# Patient Record
Sex: FEMALE | URBAN - METROPOLITAN AREA
[De-identification: names, ages, dates, MRNs, and addresses within clinical notes are randomized per-mention and may not be internally consistent; named-entity substitution may affect disease eponyms.]

---

## 2024-07-27 ENCOUNTER — APPOINTMENT (RX ONLY)
Dept: URBAN - METROPOLITAN AREA CLINIC 42 | Facility: CLINIC | Age: 39
Setting detail: DERMATOLOGY
End: 2024-07-27

## 2024-07-27 DIAGNOSIS — D49.2 NEOPLASM OF UNSPECIFIED BEHAVIOR OF BONE, SOFT TISSUE, AND SKIN: ICD-10-CM | Status: INADEQUATELY CONTROLLED

## 2024-07-27 DIAGNOSIS — D17 BENIGN LIPOMATOUS NEOPLASM: ICD-10-CM

## 2024-07-27 PROBLEM — D17.0 BENIGN LIPOMATOUS NEOPLASM OF SKIN AND SUBCUTANEOUS TISSUE OF HEAD, FACE AND NECK: Status: ACTIVE | Noted: 2024-07-27

## 2024-07-27 PROCEDURE — ? DIAGNOSIS COMMENT

## 2024-07-27 PROCEDURE — 99202 OFFICE O/P NEW SF 15 MIN: CPT

## 2024-07-27 PROCEDURE — ? PHOTO-DOCUMENTATION

## 2024-07-27 PROCEDURE — ? COUNSELING

## 2024-07-27 PROCEDURE — ? ORDER ULTRASOUND

## 2024-07-27 ASSESSMENT — LOCATION ZONE DERM: LOCATION ZONE: FACE

## 2024-07-27 ASSESSMENT — LOCATION SIMPLE DESCRIPTION DERM: LOCATION SIMPLE: RIGHT FOREHEAD

## 2024-07-27 ASSESSMENT — LOCATION DETAILED DESCRIPTION DERM: LOCATION DETAILED: RIGHT FOREHEAD

## 2024-07-27 NOTE — PROCEDURE: ORDER ULTRASOUND
Other Ultrasound Protocol Reason: Doppler ultrasound rule out lipoma vs cyst. Please check depth, muscle and nerve involvement. Patient complains of tingling and numbness in right fourth and fifth digits. Please check for nerve compression or involvement
Subcutaneous Lesion Ultrasound Reason: Duplex Ultrasound Subcutaneous mass rule out lipoma vs cyst
Ultrasound Protocol: Ultrasound of Subcutaneous Mass
Detail Level: Simple
Other Ultrasound Protocol Name: [facility] [place] [facility]
Priority: normal
Provider: Tim Al DO

## 2024-07-27 NOTE — PROCEDURE: DIAGNOSIS COMMENT
Comment: Pt reports lesion present on forehead for the past 2 yrs, mentions sx of enlarging\\nPt states that her lesion has grown a lot more recently\\nPt denies sx of pain w lesion\\nNoted that lesion is present under the muscle\\nDiscussed that surgery will need to be done with a plastic surgeon or with another doctor in-office, \\nDiscussed that ultrasound will have to be done to determine characteristics of this lesion, pt provided ultrasound order to obtain prior to scheduling surgery w \\nFU w  for surgery after receiving ultrasound results
Detail Level: Simple
Render Risk Assessment In Note?: no

## 2024-09-23 ENCOUNTER — APPOINTMENT (RX ONLY)
Dept: URBAN - METROPOLITAN AREA CLINIC 42 | Facility: CLINIC | Age: 39
Setting detail: DERMATOLOGY
End: 2024-09-23

## 2024-09-23 DIAGNOSIS — D49.2 NEOPLASM OF UNSPECIFIED BEHAVIOR OF BONE, SOFT TISSUE, AND SKIN: ICD-10-CM

## 2024-09-23 PROCEDURE — ? COUNSELING
